# Patient Record
Sex: MALE | Race: WHITE | Employment: FULL TIME | URBAN - METROPOLITAN AREA
[De-identification: names, ages, dates, MRNs, and addresses within clinical notes are randomized per-mention and may not be internally consistent; named-entity substitution may affect disease eponyms.]

---

## 2019-08-05 ENCOUNTER — HOSPITAL ENCOUNTER (EMERGENCY)
Age: 34
Discharge: HOME | End: 2019-08-05
Attending: EMERGENCY MEDICINE | Admitting: EMERGENCY MEDICINE
Payer: SELF-PAY

## 2019-08-05 VITALS
HEART RATE: 68 BPM | RESPIRATION RATE: 18 BRPM | SYSTOLIC BLOOD PRESSURE: 136 MMHG | DIASTOLIC BLOOD PRESSURE: 90 MMHG | TEMPERATURE: 98.5 F | OXYGEN SATURATION: 98 %

## 2019-08-05 DIAGNOSIS — S46.001A INJURY OF RIGHT ROTATOR CUFF, INITIAL ENCOUNTER: Primary | ICD-10-CM

## 2019-08-05 PROCEDURE — 99282 EMERGENCY DEPT VISIT SF MDM: CPT

## 2019-08-05 RX ORDER — NAPROXEN 500 MG/1
500 TABLET ORAL 2 TIMES DAILY WITH MEALS
Qty: 20 TAB | Refills: 0 | Status: SHIPPED | OUTPATIENT
Start: 2019-08-05 | End: 2019-08-15

## 2019-08-05 NOTE — LETTER
NOTIFICATION OF RETURN TO WORK / SCHOOL 
 
8/5/2019 Mr. Desean Mast [address] [place] [phone] To Whom it may concern, Please excuse Desean Mast from work 8/6/19 for medical reasons.    
 
 
 
 
 
 
Sincerely,  
 
 
 
 
Percy Grayson PA-C

## 2019-08-06 NOTE — ED PROVIDER NOTES
EMERGENCY DEPARTMENT HISTORY AND PHYSICAL EXAM    9:50 PM      Date: 8/5/2019  Patient Name: Mateusz Taylor    History of Presenting Illness     Chief Complaint   Patient presents with    Shoulder Pain         History Provided By: Patient    Chief Complaint: shoulder pain       Additional History (Context): Mateusz Taylor is a 29 y.o. male with No significant past medical history who presents with right shoulder pain x 2 weeks. He is a  at work. The pain is in the posterior aspect of the right shoulder. It is worse with climbing, lifting, and pulling. He has been taking motrin daily for the symptoms. Pain is currently 8/10. PCP: No primary care provider on file. Past History     Past Medical History:  Past Medical History:   Diagnosis Date    Hyperthyroidism        Past Surgical History:  Past Surgical History:   Procedure Laterality Date    HX TONSILLECTOMY         Family History:  History reviewed. No pertinent family history. Social History:  Social History     Tobacco Use    Smoking status: Current Every Day Smoker     Packs/day: 1.00   Substance Use Topics    Alcohol use: Not Currently    Drug use: Yes     Frequency: 7.0 times per week     Types: Marijuana       Allergies:  No Known Allergies      Review of Systems       Review of Systems   Constitutional: Negative for fever. HENT: Negative for facial swelling. Eyes: Negative for visual disturbance. Respiratory: Negative for shortness of breath. Cardiovascular: Negative for chest pain. Gastrointestinal: Negative for abdominal pain. Genitourinary: Negative for dysuria. Musculoskeletal: Positive for arthralgias. Negative for neck pain. Skin: Negative for rash. Neurological: Negative for dizziness. Psychiatric/Behavioral: Negative for confusion. All other systems reviewed and are negative.         Physical Exam     Visit Vitals  /90 (BP Patient Position: At rest)   Pulse 68   Temp 98.5 °F (36.9 °C)   Resp 18   SpO2 98%         Physical Exam   Constitutional: He appears well-developed and well-nourished. No distress. HENT:   Head: Normocephalic and atraumatic. Eyes: Conjunctivae are normal.   Neck: Normal range of motion. Neck supple. Cardiovascular: Normal rate. Pulmonary/Chest: Effort normal.   Abdominal: Soft. Musculoskeletal: Normal range of motion. Tenderness to right shoulder joint, posterior, point tenderness. No tenderness over the bursa. Pain with abduction against resistance and passive forward flexion. Neurological: He is alert. Skin: Skin is warm and dry. He is not diaphoretic. Psychiatric: He has a normal mood and affect. Nursing note and vitals reviewed. Diagnostic Study Results     Labs -  No results found for this or any previous visit (from the past 12 hour(s)). Radiologic Studies -   No orders to display         Medical Decision Making   I am the first provider for this patient. I reviewed the vital signs, available nursing notes, past medical history, past surgical history, family history and social history. Vital Signs-Reviewed the patient's vital signs. Records Reviewed: Nursing Notes (Time of Review: 9:50 PM)    ED Course: Progress Notes, Reevaluation, and Consults:      Provider Notes (Medical Decision Making): MDM  Number of Diagnoses or Management Options  Injury of right rotator cuff, initial encounter:   Diagnosis management comments: Suspect rotator cuff injury. xr not beneficial.  Recommend REST primarily, NSAIDs, Ice, and f/u with ortho. Printed rotator cuff exercises but strongly recommended that he stop climbing temporarily. Discussed treatment plan, return precautions, symptomatic relief, and expected time to improvement. All questions answered. Patient is stable for discharge and outpatient management. Diagnosis     Clinical Impression:   1.  Injury of right rotator cuff, initial encounter        Disposition: Discharged      Follow-up Information     Follow up With Specialties Details [facility] DriveToi MD Orthopedic Surgery, Orthopedic Surgery, Orthopedic Surgery Schedule an appointment as soon as possible for a visit  [facility] [address]  [address] [place] [address] EMERGENCY DEPT Emergency Medicine  Immediately if symptoms worsen [address]  [phone]           Patient's Medications   Start Taking    NAPROXEN (NAPROSYN) 500 MG TABLET    Take 1 Tab by mouth two (2) times daily (with meals) for 10 days. Continue Taking    No medications on file   These Medications have changed    No medications on file   Stop Taking    No medications on file     _______________________________    Attestations:  Scribe Attestation     Percy RAZ Grayson PA-C acting as a scribe for and in the presence of Juan Daniel Clifton PA-C      August 05, 2019 at 10:02 PM       Provider Attestation:      I personally performed the services described in the documentation, reviewed the documentation, as recorded by the scribe in my presence, and it accurately and completely records my words and actions.  August 05, 2019 at 10:02 PM - Juan Daniel Clifton PA-C  _______________________________

## 2019-08-06 NOTE — ED NOTES
[place], [place] has reviewed discharge instructions with the patient. The patient verbalized understanding. 1 prescription given.

## 2019-08-06 NOTE — ED NOTES
Pt stated pt does not have insurance, cannot afford orthopedic follow up nor MRI.  Pt asked for tx pt \"can do on his own\"

## 2019-08-06 NOTE — ED TRIAGE NOTES
Alert and ambulatory male arrived c/o right sided shoulder pain for couple of wks. Pt stated pt works for [employer], did not have a specific injury but lift heavy weights at work. Limited ROM noted on right shoulder. Pt stated taking Motrin and used icepacks with minimum relief.